# Patient Record
Sex: MALE | Race: WHITE | NOT HISPANIC OR LATINO | Employment: FULL TIME | URBAN - METROPOLITAN AREA
[De-identification: names, ages, dates, MRNs, and addresses within clinical notes are randomized per-mention and may not be internally consistent; named-entity substitution may affect disease eponyms.]

---

## 2022-01-17 ENCOUNTER — HOSPITAL ENCOUNTER (EMERGENCY)
Facility: CLINIC | Age: 52
Discharge: HOME | End: 2022-01-17
Attending: EMERGENCY MEDICINE | Admitting: EMERGENCY MEDICINE
Payer: COMMERCIAL

## 2022-01-17 VITALS
RESPIRATION RATE: 17 BRPM | SYSTOLIC BLOOD PRESSURE: 146 MMHG | OXYGEN SATURATION: 97 % | TEMPERATURE: 97.5 F | HEART RATE: 63 BPM | DIASTOLIC BLOOD PRESSURE: 22 MMHG

## 2022-01-17 DIAGNOSIS — S05.02XA ABRASION OF LEFT CORNEA, INITIAL ENCOUNTER: ICD-10-CM

## 2022-01-17 DIAGNOSIS — S05.02XA ABRASION OF LEFT CONJUNCTIVA, INITIAL ENCOUNTER: ICD-10-CM

## 2022-01-17 DIAGNOSIS — S05.8X2A ABRASION OF SCLERA OF LEFT EYE, INITIAL ENCOUNTER: ICD-10-CM

## 2022-01-17 PROCEDURE — 250N000009 HC RX 250: Performed by: EMERGENCY MEDICINE

## 2022-01-17 PROCEDURE — 99284 EMERGENCY DEPT VISIT MOD MDM: CPT

## 2022-01-17 PROCEDURE — 250N000009 HC RX 250

## 2022-01-17 RX ORDER — POLYVINYL ALCOHOL 14 MG/ML
2 SOLUTION/ DROPS OPHTHALMIC
Qty: 30 ML | Refills: 0 | Status: SHIPPED | OUTPATIENT
Start: 2022-01-17 | End: 2022-12-30

## 2022-01-17 RX ORDER — PROPARACAINE HYDROCHLORIDE 5 MG/ML
SOLUTION/ DROPS OPHTHALMIC
Status: COMPLETED
Start: 2022-01-17 | End: 2022-01-17

## 2022-01-17 RX ORDER — ERYTHROMYCIN 5 MG/G
0.5 OINTMENT OPHTHALMIC 4 TIMES DAILY
Qty: 14 G | Refills: 0 | Status: SHIPPED | OUTPATIENT
Start: 2022-01-17 | End: 2022-01-24

## 2022-01-17 RX ORDER — ERYTHROMYCIN 5 MG/G
OINTMENT OPHTHALMIC ONCE
Status: COMPLETED | OUTPATIENT
Start: 2022-01-17 | End: 2022-01-17

## 2022-01-17 RX ADMIN — ERYTHROMYCIN 1 G: 5 OINTMENT OPHTHALMIC at 02:04

## 2022-01-17 RX ADMIN — PROPARACAINE HYDROCHLORIDE: 5 SOLUTION/ DROPS OPHTHALMIC at 01:10

## 2022-01-17 RX ADMIN — FLUORESCEIN SODIUM 600 MCG: 0.6 STRIP OPHTHALMIC at 01:10

## 2022-01-17 ASSESSMENT — ENCOUNTER SYMPTOMS
EYE PAIN: 1
FEVER: 0
COUGH: 0
RHINORRHEA: 0

## 2022-01-17 ASSESSMENT — VISUAL ACUITY
OS: 20/30
OD: 20/20

## 2022-01-17 NOTE — ED PROVIDER NOTES
History   Chief Complaint:  Eye Injury       HPI   Jez Mojica is a 52 year old male who presents with a left eye injury. Tonight at 2315, the patient was filling a hockey rink in his backyard with water and was using a squeegee on the ice when the wood end of the squeegee hit his left eye. This caused him to fall but he denies hitting his head or loss of consciousness. Denies any bleeding. He does have blurry vision in his left eye and rates his pain a 5/10 here in the ED. Denies recent illness including fever, cough, or runny nose. Some tobacco use. No alcohol or illegal drug use.      Review of Systems   Constitutional: Negative for fever.   HENT: Negative for rhinorrhea.    Eyes: Positive for pain (left) and visual disturbance (blurry vision, left eye).   Respiratory: Negative for cough.    Neurological: Negative for syncope.   All other systems reviewed and are negative.      Allergies:  The patient does not have any allergies    Medications:  Sertraline  Albuterol    Past Medical History:     Diverticulosis  Basal cell carcinoma of face  Adenomatous polyp of colon  Anxiety    Past Surgical History:    Colectomy  Skin cancer excision  Appendectomy    Family History:    Father: cancer  Mother: lymphoma  Brother: anxiety  Sister: diverticulitis    Social History:  Presents alone    Physical Exam     Patient Vitals for the past 24 hrs:   BP Temp Temp src Pulse Resp SpO2   01/17/22 0048 (!) 146/22 97.5  F (36.4  C) Oral 63 17 97 %   01/17/22 0045 (!) 146/22 -- -- 73 -- 97 %       Physical Exam  Constitutional: Well developed, nontox appearance  Head: Atraumatic.   Mouth/Throat: Oropharynx is clear and moist.   Neck:  no stridor  Eyes: no scleral icterus, EOMI, PERRL, mild L inferior periorbital swelling              R eye: normal              L eye: Conjunctival injection and abrasion/injury noted to lateral inferior conjunctiva and sclera, no active hemorrhage, no corneal flare, no hyphema or hypopyon, no  purulent discharge, lateral inferior corneal abrasions, absent Edwin sign  Cardiovascular: RRR  Pulmonary/Chest: nml resp effort  Ext: Warm, well perfused, no edema  Neurological: A&O, symmetric facies, moves ext x4  Skin: Skin is warm and dry.   Psychiatric: Behavior is normal. Thought content normal.   Nursing note and vitals reviewed.    Emergency Department Course     Emergency Department Course:     Reviewed:  I reviewed nursing notes, vitals, past medical history and Care Everywhere    Assessments:  0100 I obtained history and examined the patient as noted above.    0307 I rechecked the patient and explained findings.    Interventions:  0110 Proparacaine 0.5% opthalmic solution    0110 Fluorescein 600 mcg Left Eye    0204 Erythromycin 1 g Left Eye    Disposition:  The patient was discharged to home.     Impression & Plan     Medical Decision Makin year old male presenting w/ eye injury and pain     Presentation consistent with corneal abrasion/injury and conjunctival injury, no foreign body noted on exam. Doubt bacterial conjunctivitis, viral conjunctivitis, foreign body, chemical vs allergic conjunctivitis, corneal ulcer, HSV, herpes zoster opthalmicus, endopthalmitis, orbital cellulitis.  There is no evidence of a ruptured globe on exam.  The patient was treated with antibiotics, first dose given in ED. At this time I feel the patient is safe for discharge.  Recommendations given regarding follow up with ophthalmology and return to the emergency department as needed for new or worsening symptoms.  Patient counseled on all results, diagnosis and disposition.  They are understanding and agreeable to plan. Patient discharged in stable condition.       Diagnosis:    ICD-10-CM    1. Abrasion of left cornea, initial encounter  S05.02XA    2. Abrasion of left conjunctiva, initial encounter  S05.02XA    3. Abrasion of sclera of left eye, initial encounter  S05.8X2A        Discharge Medications:  Discharge  Medication List as of 1/17/2022  3:18 AM      START taking these medications    Details   artificial tears OINT ophthalmic ointment Place 1 g Into the left eye At Bedtime, Disp-7 g, R-0, E-Prescribe      erythromycin (ROMYCIN) 5 MG/GM ophthalmic ointment Place 0.5 inches Into the left eye 4 times daily for 7 daysDisp-14 g, [id number]      polyvinyl alcohol (LIQUIFILM TEARS) 1.4 % ophthalmic solution Place 2 drops Into the left eye every hour as needed for dry eyes, Disp-30 mL, R-0, Local Print             Scribe Disclosure:  I, El Brasher, am serving as a scribe at 1:00 AM on 1/17/2022 to document services personally performed by Yohannes Macdonald MD based on my observations and the provider's statements to me.         Yohannes Macdonald MD  01/17/22 0634

## 2022-01-17 NOTE — DISCHARGE INSTRUCTIONS
Please return to the emergency department as needed for new or worsening symptoms including sudden decrease in vision, severe and uncontrollable pain, fever greater than 100.4  F, vomiting unable to keep anything down, any other concerning symptoms.    Please follow-up with an ophthalmologist tomorrow.  Please call first thing in the morning to make an appointment.      Discharge Instructions  Corneal Abrasion    Today you were treated for a scratch on the cornea of your eye, or a corneal abrasion.  The cornea is the clear layer of tissue that covers the colored part of your eye. Corneal abrasions are caused when something scratches your eye such as fingernails, animal paws, branches, pieces of paper, tiny pieces of rust, wood, glass, plastic or contact lenses. Corneal abrasions often make people feel like there is a speck of sand in the eye.  These abrasions also can cause severe eye pain, watery eyes, blurred vision and pain with bright light.      Generally, every Emergency Department visit should have a follow-up clinic visit with either a primary or a specialty clinic/provider. Please follow-up as instructed by your emergency provider today.    Return to the Emergency Department if:  Your vision worsens.  The appearance of your eye concerns you.  Anything else concerns you.    Treatment:  Tylenol  (acetaminophen), Motrin  (ibuprofen), or Advil  (ibuprofen) will help with the pain from the abrasion.    Use the antibiotic eye ointment or drops as directed until the antibiotics are finished.  Do not wear contacts until the antibiotic is finished.  Do not patch your eye, because this can increase your risk for infection.  Your symptoms should improve gradually over the next 2 days.  If they are not improving, it is very important that you see an eye provider right away.  If over the next few days, the pain is getting worse, you have increasing difficulty with vision or you have yellow drainage from your eye, you  need to see the eye provider that day.  If you have difficulty getting in to see an eye provider, please return to an Urgent Care or Emergency Department for further evaluation and treatment.    If you were given a prescription for medicine here today, be sure to read all of the information (including the package insert) that comes with your prescription.  This will include important information about the medicine, its side effects, and any warnings that you need to know about.  The pharmacist who fills the prescription can provide more information and answer questions you may have about the medicine.  If you have questions or concerns that the pharmacist cannot address, please call or return to the Emergency Department.   Remember that you can always come back to the Emergency Department if you are not able to see your regular provider in the amount of time listed above, if you get any new symptoms, or if there is anything that worries you.

## 2022-01-17 NOTE — ED TRIAGE NOTES
BIBA from home. Here for left eye injury with a pice of wood at around 2315. C/o pain and redness to the sclera.     Obvious laceration to the eye ball noted

## 2022-12-30 ENCOUNTER — OFFICE VISIT (OUTPATIENT)
Dept: URGENT CARE | Facility: URGENT CARE | Age: 52
End: 2022-12-30
Payer: COMMERCIAL

## 2022-12-30 VITALS
RESPIRATION RATE: 18 BRPM | TEMPERATURE: 98.6 F | HEART RATE: 88 BPM | DIASTOLIC BLOOD PRESSURE: 82 MMHG | WEIGHT: 179 LBS | SYSTOLIC BLOOD PRESSURE: 129 MMHG | OXYGEN SATURATION: 98 %

## 2022-12-30 DIAGNOSIS — K04.7 TOOTH ABSCESS: Primary | ICD-10-CM

## 2022-12-30 PROCEDURE — 99203 OFFICE O/P NEW LOW 30 MIN: CPT | Performed by: PHYSICIAN ASSISTANT

## 2022-12-30 RX ORDER — SERTRALINE HYDROCHLORIDE 100 MG/1
2 TABLET, FILM COATED ORAL DAILY
COMMUNITY
Start: 2022-12-13

## 2022-12-30 RX ORDER — AMOXICILLIN 875 MG
875 TABLET ORAL 2 TIMES DAILY
Qty: 20 TABLET | Refills: 0 | Status: SHIPPED | OUTPATIENT
Start: 2022-12-30 | End: 2023-01-09

## 2022-12-31 NOTE — PROGRESS NOTES
SUBJECTIVE:   Jez Mojica is a 52 year old male who complains of TOOTH PAIN left eye tooth top for 5 days. He denies a history of fever  He is planning on seeing his dentist next week     OBJECTIVE:    /82   Pulse 88   Temp 98.6  F (37  C) (Tympanic)   Resp 18   Wt 81.2 kg (179 lb)   SpO2 98%     He appears well, vital signs are as noted by the nurse. Ears normal.  Throat and pharynx normal.  Teeth in ill repair and stained, he is tender over the upper left eye tooth with minor gum erythema  Neck supple. No adenopathy in the neck. . The chest is clear, without wheezes or rales.    ASSESSMENT:    Diagnosis Comments   1. Tooth abscess  amoxicillin (AMOXIL) 875 MG tablet             PLAN:  Symptomatic therapy suggested: . Call or return to clinic prn if these symptoms worsen or fail to improve as anticipated.

## 2023-02-19 ENCOUNTER — APPOINTMENT (OUTPATIENT)
Dept: GENERAL RADIOLOGY | Facility: CLINIC | Age: 53
End: 2023-02-19
Attending: EMERGENCY MEDICINE
Payer: COMMERCIAL

## 2023-02-19 ENCOUNTER — ANESTHESIA EVENT (OUTPATIENT)
Dept: SURGERY | Facility: CLINIC | Age: 53
End: 2023-02-19
Payer: COMMERCIAL

## 2023-02-19 ENCOUNTER — HOSPITAL ENCOUNTER (OUTPATIENT)
Facility: CLINIC | Age: 53
Setting detail: OBSERVATION
Discharge: HOME | End: 2023-02-19
Attending: EMERGENCY MEDICINE | Admitting: INTERNAL MEDICINE
Payer: COMMERCIAL

## 2023-02-19 ENCOUNTER — ANESTHESIA (OUTPATIENT)
Dept: SURGERY | Facility: CLINIC | Age: 53
End: 2023-02-19
Payer: COMMERCIAL

## 2023-02-19 ENCOUNTER — APPOINTMENT (OUTPATIENT)
Dept: ULTRASOUND IMAGING | Facility: CLINIC | Age: 53
End: 2023-02-19
Attending: EMERGENCY MEDICINE
Payer: COMMERCIAL

## 2023-02-19 VITALS
SYSTOLIC BLOOD PRESSURE: 124 MMHG | HEIGHT: 68 IN | OXYGEN SATURATION: 95 % | TEMPERATURE: 97 F | WEIGHT: 171.1 LBS | HEART RATE: 71 BPM | RESPIRATION RATE: 16 BRPM | BODY MASS INDEX: 25.93 KG/M2 | DIASTOLIC BLOOD PRESSURE: 82 MMHG

## 2023-02-19 DIAGNOSIS — K80.00 CALCULUS OF GALLBLADDER WITH ACUTE CHOLECYSTITIS WITHOUT OBSTRUCTION: ICD-10-CM

## 2023-02-19 DIAGNOSIS — R07.9 CHEST PAIN, UNSPECIFIED TYPE: ICD-10-CM

## 2023-02-19 LAB
ALBUMIN SERPL BCG-MCNC: 4.3 G/DL (ref 3.5–5.2)
ALP SERPL-CCNC: 113 U/L (ref 40–129)
ALT SERPL W P-5'-P-CCNC: 30 U/L (ref 10–50)
ANION GAP SERPL CALCULATED.3IONS-SCNC: 11 MMOL/L (ref 7–15)
AST SERPL W P-5'-P-CCNC: 26 U/L (ref 10–50)
ATRIAL RATE - MUSE: 66 BPM
BASOPHILS # BLD AUTO: 0 10E3/UL (ref 0–0.2)
BASOPHILS NFR BLD AUTO: 0 %
BILIRUB SERPL-MCNC: 0.6 MG/DL
BUN SERPL-MCNC: 13.6 MG/DL (ref 6–20)
CALCIUM SERPL-MCNC: 10.1 MG/DL (ref 8.6–10)
CHLORIDE SERPL-SCNC: 98 MMOL/L (ref 98–107)
CREAT SERPL-MCNC: 0.93 MG/DL (ref 0.67–1.17)
DEPRECATED HCO3 PLAS-SCNC: 27 MMOL/L (ref 22–29)
DIASTOLIC BLOOD PRESSURE - MUSE: NORMAL MMHG
EOSINOPHIL # BLD AUTO: 0.1 10E3/UL (ref 0–0.7)
EOSINOPHIL NFR BLD AUTO: 1 %
ERYTHROCYTE [DISTWIDTH] IN BLOOD BY AUTOMATED COUNT: 12.6 % (ref 10–15)
GFR SERPL CREATININE-BSD FRML MDRD: >90 ML/MIN/1.73M2
GLUCOSE SERPL-MCNC: 139 MG/DL (ref 70–99)
HCT VFR BLD AUTO: 43.5 % (ref 40–53)
HGB BLD-MCNC: 14.8 G/DL (ref 13.3–17.7)
HOLD SPECIMEN: NORMAL
IMM GRANULOCYTES # BLD: 0.1 10E3/UL
IMM GRANULOCYTES NFR BLD: 1 %
INTERPRETATION ECG - MUSE: NORMAL
LIPASE SERPL-CCNC: 54 U/L (ref 13–60)
LYMPHOCYTES # BLD AUTO: 1.1 10E3/UL (ref 0.8–5.3)
LYMPHOCYTES NFR BLD AUTO: 12 %
MCH RBC QN AUTO: 28.7 PG (ref 26.5–33)
MCHC RBC AUTO-ENTMCNC: 34 G/DL (ref 31.5–36.5)
MCV RBC AUTO: 84 FL (ref 78–100)
MONOCYTES # BLD AUTO: 0.6 10E3/UL (ref 0–1.3)
MONOCYTES NFR BLD AUTO: 7 %
NEUTROPHILS # BLD AUTO: 6.7 10E3/UL (ref 1.6–8.3)
NEUTROPHILS NFR BLD AUTO: 79 %
NRBC # BLD AUTO: 0 10E3/UL
NRBC BLD AUTO-RTO: 0 /100
P AXIS - MUSE: 4 DEGREES
PLATELET # BLD AUTO: 175 10E3/UL (ref 150–450)
POTASSIUM SERPL-SCNC: 3.8 MMOL/L (ref 3.4–5.3)
PR INTERVAL - MUSE: 128 MS
PROT SERPL-MCNC: 7.5 G/DL (ref 6.4–8.3)
QRS DURATION - MUSE: 90 MS
QT - MUSE: 402 MS
QTC - MUSE: 421 MS
R AXIS - MUSE: 79 DEGREES
RBC # BLD AUTO: 5.16 10E6/UL (ref 4.4–5.9)
SODIUM SERPL-SCNC: 136 MMOL/L (ref 136–145)
SYSTOLIC BLOOD PRESSURE - MUSE: NORMAL MMHG
T AXIS - MUSE: 69 DEGREES
TROPONIN T SERPL HS-MCNC: 8 NG/L
TROPONIN T SERPL HS-MCNC: 9 NG/L
VENTRICULAR RATE- MUSE: 66 BPM
WBC # BLD AUTO: 8.5 10E3/UL (ref 4–11)

## 2023-02-19 PROCEDURE — 93005 ELECTROCARDIOGRAM TRACING: CPT

## 2023-02-19 PROCEDURE — 250N000009 HC RX 250: Performed by: EMERGENCY MEDICINE

## 2023-02-19 PROCEDURE — 80053 COMPREHEN METABOLIC PANEL: CPT | Performed by: EMERGENCY MEDICINE

## 2023-02-19 PROCEDURE — 47562 LAPAROSCOPIC CHOLECYSTECTOMY: CPT | Performed by: SURGERY

## 2023-02-19 PROCEDURE — 360N000076 HC SURGERY LEVEL 3, PER MIN: Performed by: SURGERY

## 2023-02-19 PROCEDURE — 250N000013 HC RX MED GY IP 250 OP 250 PS 637: Performed by: EMERGENCY MEDICINE

## 2023-02-19 PROCEDURE — 250N000011 HC RX IP 250 OP 636: Performed by: HOSPITALIST

## 2023-02-19 PROCEDURE — 47562 LAPAROSCOPIC CHOLECYSTECTOMY: CPT | Mod: AS | Performed by: PHYSICIAN ASSISTANT

## 2023-02-19 PROCEDURE — C9113 INJ PANTOPRAZOLE SODIUM, VIA: HCPCS | Performed by: HOSPITALIST

## 2023-02-19 PROCEDURE — 88304 TISSUE EXAM BY PATHOLOGIST: CPT | Mod: TC | Performed by: SURGERY

## 2023-02-19 PROCEDURE — 250N000013 HC RX MED GY IP 250 OP 250 PS 637: Performed by: SURGERY

## 2023-02-19 PROCEDURE — 76705 ECHO EXAM OF ABDOMEN: CPT

## 2023-02-19 PROCEDURE — 710N000012 HC RECOVERY PHASE 2, PER MINUTE: Performed by: SURGERY

## 2023-02-19 PROCEDURE — 99221 1ST HOSP IP/OBS SF/LOW 40: CPT | Mod: 57 | Performed by: PHYSICIAN ASSISTANT

## 2023-02-19 PROCEDURE — 999N000141 HC STATISTIC PRE-PROCEDURE NURSING ASSESSMENT: Performed by: SURGERY

## 2023-02-19 PROCEDURE — 250N000009 HC RX 250: Performed by: NURSE ANESTHETIST, CERTIFIED REGISTERED

## 2023-02-19 PROCEDURE — 258N000003 HC RX IP 258 OP 636: Performed by: NURSE ANESTHETIST, CERTIFIED REGISTERED

## 2023-02-19 PROCEDURE — 250N000009 HC RX 250: Performed by: SURGERY

## 2023-02-19 PROCEDURE — 88304 TISSUE EXAM BY PATHOLOGIST: CPT | Mod: 26 | Performed by: PATHOLOGY

## 2023-02-19 PROCEDURE — 272N000001 HC OR GENERAL SUPPLY STERILE: Performed by: SURGERY

## 2023-02-19 PROCEDURE — 710N000009 HC RECOVERY PHASE 1, LEVEL 1, PER MIN: Performed by: SURGERY

## 2023-02-19 PROCEDURE — 258N000003 HC RX IP 258 OP 636: Performed by: HOSPITALIST

## 2023-02-19 PROCEDURE — 99207 PR NO BILLABLE SERVICE THIS VISIT: CPT | Performed by: HOSPITALIST

## 2023-02-19 PROCEDURE — G0378 HOSPITAL OBSERVATION PER HR: HCPCS

## 2023-02-19 PROCEDURE — 84484 ASSAY OF TROPONIN QUANT: CPT | Performed by: EMERGENCY MEDICINE

## 2023-02-19 PROCEDURE — 99285 EMERGENCY DEPT VISIT HI MDM: CPT | Mod: 25

## 2023-02-19 PROCEDURE — 85025 COMPLETE CBC W/AUTO DIFF WBC: CPT | Performed by: EMERGENCY MEDICINE

## 2023-02-19 PROCEDURE — 250N000011 HC RX IP 250 OP 636: Performed by: EMERGENCY MEDICINE

## 2023-02-19 PROCEDURE — 370N000017 HC ANESTHESIA TECHNICAL FEE, PER MIN: Performed by: SURGERY

## 2023-02-19 PROCEDURE — 96374 THER/PROPH/DIAG INJ IV PUSH: CPT

## 2023-02-19 PROCEDURE — 250N000025 HC SEVOFLURANE, PER MIN: Performed by: SURGERY

## 2023-02-19 PROCEDURE — 99221 1ST HOSP IP/OBS SF/LOW 40: CPT | Mod: AI | Performed by: INTERNAL MEDICINE

## 2023-02-19 PROCEDURE — 71046 X-RAY EXAM CHEST 2 VIEWS: CPT

## 2023-02-19 PROCEDURE — 83690 ASSAY OF LIPASE: CPT | Performed by: EMERGENCY MEDICINE

## 2023-02-19 PROCEDURE — 250N000011 HC RX IP 250 OP 636: Performed by: NURSE ANESTHETIST, CERTIFIED REGISTERED

## 2023-02-19 PROCEDURE — 36415 COLL VENOUS BLD VENIPUNCTURE: CPT | Performed by: EMERGENCY MEDICINE

## 2023-02-19 RX ORDER — HYDROCODONE BITARTRATE AND ACETAMINOPHEN 5; 325 MG/1; MG/1
1-2 TABLET ORAL EVERY 4 HOURS PRN
Qty: 11 TABLET | Refills: 0 | Status: SHIPPED | OUTPATIENT
Start: 2023-02-19

## 2023-02-19 RX ORDER — ONDANSETRON 2 MG/ML
4 INJECTION INTRAMUSCULAR; INTRAVENOUS EVERY 6 HOURS PRN
Status: DISCONTINUED | OUTPATIENT
Start: 2023-02-19 | End: 2023-02-19 | Stop reason: HOSPADM

## 2023-02-19 RX ORDER — HYDROMORPHONE HYDROCHLORIDE 1 MG/ML
INJECTION, SOLUTION INTRAMUSCULAR; INTRAVENOUS; SUBCUTANEOUS PRN
Status: DISCONTINUED | OUTPATIENT
Start: 2023-02-19 | End: 2023-02-19

## 2023-02-19 RX ORDER — NALOXONE HYDROCHLORIDE 0.4 MG/ML
0.2 INJECTION, SOLUTION INTRAMUSCULAR; INTRAVENOUS; SUBCUTANEOUS
Status: DISCONTINUED | OUTPATIENT
Start: 2023-02-19 | End: 2023-02-19 | Stop reason: HOSPADM

## 2023-02-19 RX ORDER — FENTANYL CITRATE 50 UG/ML
25 INJECTION, SOLUTION INTRAMUSCULAR; INTRAVENOUS EVERY 5 MIN PRN
Status: DISCONTINUED | OUTPATIENT
Start: 2023-02-19 | End: 2023-02-19 | Stop reason: HOSPADM

## 2023-02-19 RX ORDER — PIPERACILLIN SODIUM, TAZOBACTAM SODIUM 4; .5 G/20ML; G/20ML
4.5 INJECTION, POWDER, LYOPHILIZED, FOR SOLUTION INTRAVENOUS ONCE
Status: COMPLETED | OUTPATIENT
Start: 2023-02-19 | End: 2023-02-19

## 2023-02-19 RX ORDER — PIPERACILLIN SODIUM, TAZOBACTAM SODIUM 3; .375 G/15ML; G/15ML
3.38 INJECTION, POWDER, LYOPHILIZED, FOR SOLUTION INTRAVENOUS EVERY 6 HOURS
Status: DISCONTINUED | OUTPATIENT
Start: 2023-02-19 | End: 2023-02-19 | Stop reason: HOSPADM

## 2023-02-19 RX ORDER — HYDROMORPHONE HCL IN WATER/PF 6 MG/30 ML
0.4 PATIENT CONTROLLED ANALGESIA SYRINGE INTRAVENOUS
Status: DISCONTINUED | OUTPATIENT
Start: 2023-02-19 | End: 2023-02-19 | Stop reason: HOSPADM

## 2023-02-19 RX ORDER — ONDANSETRON 4 MG/1
4 TABLET, ORALLY DISINTEGRATING ORAL EVERY 30 MIN PRN
Status: DISCONTINUED | OUTPATIENT
Start: 2023-02-19 | End: 2023-02-19 | Stop reason: HOSPADM

## 2023-02-19 RX ORDER — AMOXICILLIN 250 MG
1-2 CAPSULE ORAL 2 TIMES DAILY PRN
Qty: 15 TABLET | Refills: 0 | Status: SHIPPED | OUTPATIENT
Start: 2023-02-19 | End: 2023-02-19

## 2023-02-19 RX ORDER — SODIUM CHLORIDE 9 MG/ML
INJECTION, SOLUTION INTRAVENOUS CONTINUOUS
Status: DISCONTINUED | OUTPATIENT
Start: 2023-02-19 | End: 2023-02-19 | Stop reason: HOSPADM

## 2023-02-19 RX ORDER — ACETAMINOPHEN 325 MG/1
650 TABLET ORAL EVERY 6 HOURS PRN
Status: DISCONTINUED | OUTPATIENT
Start: 2023-02-19 | End: 2023-02-19 | Stop reason: HOSPADM

## 2023-02-19 RX ORDER — SODIUM CHLORIDE, SODIUM LACTATE, POTASSIUM CHLORIDE, CALCIUM CHLORIDE 600; 310; 30; 20 MG/100ML; MG/100ML; MG/100ML; MG/100ML
INJECTION, SOLUTION INTRAVENOUS CONTINUOUS
Status: DISCONTINUED | OUTPATIENT
Start: 2023-02-19 | End: 2023-02-19 | Stop reason: HOSPADM

## 2023-02-19 RX ORDER — OXYCODONE HYDROCHLORIDE 5 MG/1
5 TABLET ORAL EVERY 4 HOURS PRN
Status: DISCONTINUED | OUTPATIENT
Start: 2023-02-19 | End: 2023-02-19 | Stop reason: HOSPADM

## 2023-02-19 RX ORDER — HYDROCODONE BITARTRATE AND ACETAMINOPHEN 5; 325 MG/1; MG/1
1-2 TABLET ORAL EVERY 4 HOURS PRN
Qty: 12 TABLET | Refills: 0 | Status: SHIPPED | OUTPATIENT
Start: 2023-02-19 | End: 2023-02-19

## 2023-02-19 RX ORDER — ONDANSETRON 2 MG/ML
INJECTION INTRAMUSCULAR; INTRAVENOUS PRN
Status: DISCONTINUED | OUTPATIENT
Start: 2023-02-19 | End: 2023-02-19

## 2023-02-19 RX ORDER — HYDROMORPHONE HCL IN WATER/PF 6 MG/30 ML
0.2 PATIENT CONTROLLED ANALGESIA SYRINGE INTRAVENOUS
Status: DISCONTINUED | OUTPATIENT
Start: 2023-02-19 | End: 2023-02-19 | Stop reason: HOSPADM

## 2023-02-19 RX ORDER — ONDANSETRON 2 MG/ML
4 INJECTION INTRAMUSCULAR; INTRAVENOUS EVERY 30 MIN PRN
Status: DISCONTINUED | OUTPATIENT
Start: 2023-02-19 | End: 2023-02-19 | Stop reason: HOSPADM

## 2023-02-19 RX ORDER — DEXAMETHASONE SODIUM PHOSPHATE 4 MG/ML
INJECTION, SOLUTION INTRA-ARTICULAR; INTRALESIONAL; INTRAMUSCULAR; INTRAVENOUS; SOFT TISSUE PRN
Status: DISCONTINUED | OUTPATIENT
Start: 2023-02-19 | End: 2023-02-19

## 2023-02-19 RX ORDER — SERTRALINE HYDROCHLORIDE 100 MG/1
200 TABLET, FILM COATED ORAL DAILY
Status: DISCONTINUED | OUTPATIENT
Start: 2023-02-20 | End: 2023-02-19 | Stop reason: HOSPADM

## 2023-02-19 RX ORDER — NALOXONE HYDROCHLORIDE 0.4 MG/ML
0.4 INJECTION, SOLUTION INTRAMUSCULAR; INTRAVENOUS; SUBCUTANEOUS
Status: DISCONTINUED | OUTPATIENT
Start: 2023-02-19 | End: 2023-02-19 | Stop reason: HOSPADM

## 2023-02-19 RX ORDER — LIDOCAINE HYDROCHLORIDE 20 MG/ML
INJECTION, SOLUTION INFILTRATION; PERINEURAL PRN
Status: DISCONTINUED | OUTPATIENT
Start: 2023-02-19 | End: 2023-02-19

## 2023-02-19 RX ORDER — ACETAMINOPHEN 650 MG/1
650 SUPPOSITORY RECTAL EVERY 6 HOURS PRN
Status: DISCONTINUED | OUTPATIENT
Start: 2023-02-19 | End: 2023-02-19 | Stop reason: HOSPADM

## 2023-02-19 RX ORDER — DEXMEDETOMIDINE HYDROCHLORIDE 4 UG/ML
INJECTION, SOLUTION INTRAVENOUS PRN
Status: DISCONTINUED | OUTPATIENT
Start: 2023-02-19 | End: 2023-02-19

## 2023-02-19 RX ORDER — FENTANYL CITRATE 50 UG/ML
50 INJECTION, SOLUTION INTRAMUSCULAR; INTRAVENOUS EVERY 5 MIN PRN
Status: DISCONTINUED | OUTPATIENT
Start: 2023-02-19 | End: 2023-02-19 | Stop reason: HOSPADM

## 2023-02-19 RX ORDER — OXYCODONE HYDROCHLORIDE 5 MG/1
10 TABLET ORAL EVERY 4 HOURS PRN
Status: DISCONTINUED | OUTPATIENT
Start: 2023-02-19 | End: 2023-02-19 | Stop reason: HOSPADM

## 2023-02-19 RX ORDER — HYDROMORPHONE HCL IN WATER/PF 6 MG/30 ML
0.4 PATIENT CONTROLLED ANALGESIA SYRINGE INTRAVENOUS EVERY 5 MIN PRN
Status: DISCONTINUED | OUTPATIENT
Start: 2023-02-19 | End: 2023-02-19 | Stop reason: HOSPADM

## 2023-02-19 RX ORDER — AMOXICILLIN 250 MG
1-2 CAPSULE ORAL 2 TIMES DAILY PRN
Qty: 16 TABLET | Refills: 0 | Status: SHIPPED | OUTPATIENT
Start: 2023-02-19

## 2023-02-19 RX ORDER — BUPIVACAINE HYDROCHLORIDE AND EPINEPHRINE 5; 5 MG/ML; UG/ML
INJECTION, SOLUTION PERINEURAL PRN
Status: DISCONTINUED | OUTPATIENT
Start: 2023-02-19 | End: 2023-02-19 | Stop reason: HOSPADM

## 2023-02-19 RX ORDER — HYDROMORPHONE HCL IN WATER/PF 6 MG/30 ML
0.2 PATIENT CONTROLLED ANALGESIA SYRINGE INTRAVENOUS EVERY 5 MIN PRN
Status: DISCONTINUED | OUTPATIENT
Start: 2023-02-19 | End: 2023-02-19 | Stop reason: HOSPADM

## 2023-02-19 RX ORDER — PROPOFOL 10 MG/ML
INJECTION, EMULSION INTRAVENOUS PRN
Status: DISCONTINUED | OUTPATIENT
Start: 2023-02-19 | End: 2023-02-19

## 2023-02-19 RX ORDER — ONDANSETRON 4 MG/1
4 TABLET, ORALLY DISINTEGRATING ORAL EVERY 6 HOURS PRN
Status: DISCONTINUED | OUTPATIENT
Start: 2023-02-19 | End: 2023-02-19 | Stop reason: HOSPADM

## 2023-02-19 RX ORDER — FENTANYL CITRATE 50 UG/ML
INJECTION, SOLUTION INTRAMUSCULAR; INTRAVENOUS PRN
Status: DISCONTINUED | OUTPATIENT
Start: 2023-02-19 | End: 2023-02-19

## 2023-02-19 RX ORDER — HYDROCODONE BITARTRATE AND ACETAMINOPHEN 5; 325 MG/1; MG/1
1-2 TABLET ORAL
Status: DISCONTINUED | OUTPATIENT
Start: 2023-02-19 | End: 2023-02-19 | Stop reason: HOSPADM

## 2023-02-19 RX ADMIN — PIPERACILLIN AND TAZOBACTAM 3.38 G: 3; .375 INJECTION, POWDER, FOR SOLUTION INTRAVENOUS at 11:27

## 2023-02-19 RX ADMIN — LIDOCAINE HYDROCHLORIDE 30 ML: 20 SOLUTION ORAL; TOPICAL at 04:59

## 2023-02-19 RX ADMIN — FENTANYL CITRATE 100 MCG: 50 INJECTION, SOLUTION INTRAMUSCULAR; INTRAVENOUS at 11:18

## 2023-02-19 RX ADMIN — PANTOPRAZOLE SODIUM 40 MG: 40 INJECTION, POWDER, FOR SOLUTION INTRAVENOUS at 09:22

## 2023-02-19 RX ADMIN — MIDAZOLAM 2 MG: 1 INJECTION INTRAMUSCULAR; INTRAVENOUS at 11:13

## 2023-02-19 RX ADMIN — LIDOCAINE HYDROCHLORIDE 100 MG: 20 INJECTION, SOLUTION INFILTRATION; PERINEURAL at 11:18

## 2023-02-19 RX ADMIN — ONDANSETRON 4 MG: 2 INJECTION INTRAMUSCULAR; INTRAVENOUS at 12:03

## 2023-02-19 RX ADMIN — HYDROMORPHONE HYDROCHLORIDE 0.5 MG: 1 INJECTION, SOLUTION INTRAMUSCULAR; INTRAVENOUS; SUBCUTANEOUS at 11:46

## 2023-02-19 RX ADMIN — DEXAMETHASONE SODIUM PHOSPHATE 4 MG: 4 INJECTION, SOLUTION INTRA-ARTICULAR; INTRALESIONAL; INTRAMUSCULAR; INTRAVENOUS; SOFT TISSUE at 11:27

## 2023-02-19 RX ADMIN — DEXMEDETOMIDINE HYDROCHLORIDE 8 MCG: 200 INJECTION INTRAVENOUS at 11:51

## 2023-02-19 RX ADMIN — OXYCODONE HYDROCHLORIDE 5 MG: 5 TABLET ORAL at 13:21

## 2023-02-19 RX ADMIN — PHENYLEPHRINE HYDROCHLORIDE 100 MCG: 10 INJECTION INTRAVENOUS at 11:28

## 2023-02-19 RX ADMIN — DEXMEDETOMIDINE HYDROCHLORIDE 12 MCG: 200 INJECTION INTRAVENOUS at 11:48

## 2023-02-19 RX ADMIN — PHENYLEPHRINE HYDROCHLORIDE 100 MCG: 10 INJECTION INTRAVENOUS at 11:24

## 2023-02-19 RX ADMIN — PROPOFOL 200 MG: 10 INJECTION, EMULSION INTRAVENOUS at 11:18

## 2023-02-19 RX ADMIN — FENTANYL CITRATE 50 MCG: 50 INJECTION, SOLUTION INTRAMUSCULAR; INTRAVENOUS at 12:00

## 2023-02-19 RX ADMIN — PHENYLEPHRINE HYDROCHLORIDE 100 MCG: 10 INJECTION INTRAVENOUS at 11:32

## 2023-02-19 RX ADMIN — SUGAMMADEX 200 MG: 100 INJECTION, SOLUTION INTRAVENOUS at 12:18

## 2023-02-19 RX ADMIN — SODIUM CHLORIDE: 9 INJECTION, SOLUTION INTRAVENOUS at 09:24

## 2023-02-19 RX ADMIN — PHENYLEPHRINE HYDROCHLORIDE 100 MCG: 10 INJECTION INTRAVENOUS at 11:40

## 2023-02-19 RX ADMIN — PIPERACILLIN AND TAZOBACTAM 4.5 G: 4; .5 INJECTION, POWDER, FOR SOLUTION INTRAVENOUS at 06:36

## 2023-02-19 RX ADMIN — ROCURONIUM BROMIDE 50 MG: 50 INJECTION, SOLUTION INTRAVENOUS at 11:19

## 2023-02-19 RX ADMIN — SODIUM CHLORIDE: 9 INJECTION, SOLUTION INTRAVENOUS at 12:33

## 2023-02-19 ASSESSMENT — ENCOUNTER SYMPTOMS
ARTHRALGIAS: 0
DYSRHYTHMIAS: 0
BACK PAIN: 0

## 2023-02-19 ASSESSMENT — ACTIVITIES OF DAILY LIVING (ADL)
ADLS_ACUITY_SCORE: 35

## 2023-02-19 NOTE — OP NOTE
General Surgery Operative Note    PREOPERATIVE DIAGNOSIS:  Calculus of gallbladder with acute cholecystitis without obstruction [K80.00]    POSTOPERATIVE DIAGNOSIS:  same    PROCEDURE:   Procedure(s):  CHOLECYSTECTOMY, LAPAROSCOPIC    ANESTHESIA:  General.    PREOPERATIVE MEDICATIONS:  Ancef IV.    SURGEON:  Jez Means MD    ASSISTANT:  Arely Mcgowan PA-C  A first assistant was necessary owing to challenging laparoscopic visualization and exposure.  Retraction was also necessary.    INDICATIONS:  Symptomatic cholelithiasis    PROCEDURE:  The patient was taken to the operating suite and uneventfully endotracheally intubated.  The abdomen was prepped and draped in a sterile fashion.  Surgeon initiated timeout was acknowledged.  We entered the abdomen in the left upper quadrant using Visiport technique.  Three other trocars were placed under laparoscopic visualization.  We elevated the liver and were able to identify a somewhat inflamed gallbladder.  The gallbladder was grasped and used to elevate the liver further.  We began dissecting out some fatty adhesions down near the neck of the gallbladder until a cystic duct was encountered.  We continued our dissection using combination of sharp and blunt dissection until the cystic duct was largely dissected out.  We continued our dissection up along the sides of the gallbladder, both medially and laterally, until we had created a space between the gallbladder and the liver.  At this point, we encountered the cystic artery, just posterior and lateral to the cystic duct.  This again was dissected out.  Once we had created a window where only the cystic artery and duct were noted to be entering the gallbladder, we felt that this represented our critical view.  The cystic artery and duct were then doubly clipped and divided.  We continued our dissection up along the body of the gallbladder, freeing all attachments and adhesions of the gallbladder to the liver.   Gallbladder was removed from the liver in an atraumatic fashion.  The gallbladder was then brought up through the umbilical port site and removed from the abdomen.  The gallbladder fossa was reinspected, and all areas of bleeding were managed with electrocautery.  We irrigated the area with normal saline and aspirated it out.  We then removed the umbilical port trocar and closed the fascia with a figure-of-eight 0 Vicryl suture.  This was done using the Thien-Vidhya device.  We then reinspected the abdomen, and everything appeared to be in pristine condition.  We removed the trocars under laparoscopic visualization and desufflated the abdomen with the Streeter suction .  The skin edges were reapproximated with 4-0 Vicryl and Steri-Strips.  The patient was uneventfully extubated, awakened and taken to the PACU in stable condition.  At the conclusion of the case, all lap and needle counts were correct.      ESTIMATED BLOOD LOSS:  5 mL    INTRAOPERATIVE FINDINGS:  Gallbladder with stones    Jez Means MD, MD

## 2023-02-19 NOTE — ANESTHESIA PREPROCEDURE EVALUATION
Anesthesia Pre-Procedure Evaluation    Patient: Jez Mojica   MRN: [id number] : 1970        Procedure : Procedure(s):  CHOLECYSTECTOMY, LAPAROSCOPIC          Past Medical History:   Diagnosis Date     Diverticulosis       Past Surgical History:   Procedure Laterality Date     LAPAROSCOPIC ASSISTED COLECTOMY       ZZC APPENDECTOMY  1996      No Known Allergies   Social History     Tobacco Use     Smoking status: Never     Smokeless tobacco: Current     Types: Chew   Substance Use Topics     Alcohol use: No      Wt Readings from Last 1 Encounters:   23 77.6 kg (171 lb 1.6 oz)        Anesthesia Evaluation            ROS/MED HX  ENT/Pulmonary:  - neg pulmonary ROS     Neurologic:       Cardiovascular:  - neg cardiovascular ROS  (-) arrhythmias   METS/Exercise Tolerance: >4 METS    Hematologic:       Musculoskeletal:       GI/Hepatic: Comment: Diverticulosis s/p colectomy    (+) cholecystitis/cholelithiasis,     Renal/Genitourinary:       Endo:       Psychiatric/Substance Use:  - neg psychiatric ROS     Infectious Disease:       Malignancy:       Other:            Physical Exam    Airway        Mallampati: II   TM distance: > 3 FB   Neck ROM: full   Mouth opening: > 3 cm    Respiratory Devices and Support         Dental       (+) Completely normal teeth      Cardiovascular   cardiovascular exam normal          Pulmonary   pulmonary exam normal                OUTSIDE LABS:  CBC:   Lab Results   Component Value Date    WBC 8.5 2023    WBC 16.8 (H) 2010    HGB 14.8 2023    HGB 15.2 2010    HCT 43.5 2023    HCT 44.5 2010     2023     2010     BMP:   Lab Results   Component Value Date     2023     2010    POTASSIUM 3.8 2023    POTASSIUM 4.0 2010    CHLORIDE 98 2023    CHLORIDE 101 2010    CO2 27 2023    CO2 28 2010    BUN 13.6 2023    BUN 12 2010    CR 0.93 2023     CR 0.98 08/20/2010     (H) 02/19/2023     (H) 08/20/2010     COAGS: No results found for: PTT, INR, FIBR  POC: No results found for: BGM, HCG, HCGS  HEPATIC:   Lab Results   Component Value Date    ALBUMIN 4.3 02/19/2023    PROTTOTAL 7.5 02/19/2023    ALT 30 02/19/2023    AST 26 02/19/2023    ALKPHOS 113 02/19/2023    BILITOTAL 0.6 02/19/2023     OTHER:   Lab Results   Component Value Date    NUVIA 10.1 (H) 02/19/2023    LIPASE 54 02/19/2023       Anesthesia Plan    ASA Status:  2   NPO Status:  NPO Appropriate    Anesthesia Type: General.     - Airway: ETT   Induction: Intravenous, RSI, Propofol.   Maintenance: Balanced.   Techniques and Equipment:     - Airway: Video-Laryngoscope         Consents    Anesthesia Plan(s) and associated risks, benefits, and realistic alternatives discussed. Questions answered and patient/representative(s) expressed understanding.    - Discussed:     - Discussed with:  Patient         Postoperative Care    Pain management: IV analgesics, Multi-modal analgesia.   PONV prophylaxis: Ondansetron (or other 5HT-3), Dexamethasone or Solumedrol, Background Propofol Infusion     Comments:                Tree Newton MD

## 2023-02-19 NOTE — PHARMACY-ADMISSION MEDICATION HISTORY
Pharmacy Medication History  Admission medication history interview status for the 2/19/2023  admission is complete. See EPIC admission navigator for prior to admission medications     Location of Interview: Patient room  Medication history sources: Patient and Surescripts    Significant changes made to the medication list:      In the past week, patient estimated taking medication this percent of the time: greater than 90%    Medication Affordability:       Additional medication history information:       Medication reconciliation completed by provider prior to medication history? No    Time spent in this activity: 5min     Prior to Admission medications    Medication Sig Last Dose Taking? Auth Provider Long Term End Date   sertraline (ZOLOFT) 100 MG tablet Take 2 tablets by mouth daily 2/17/2023 Yes Reported, Patient Yes        The information provided in this note is only as accurate as the sources available at the time of update(s)   Karma ReyesD

## 2023-02-19 NOTE — BRIEF OP NOTE
[facility]    Brief Operative Note    Pre-operative diagnosis: Calculus of gallbladder with acute cholecystitis without obstruction [K80.00]  Post-operative diagnosis Same    Procedure:    CHOLECYSTECTOMY, LAPAROSCOPIC    Surgeon:      * Jez Means MD - Primary     * Arely Mcgowan PA-C - Assisting    Anesthesia: General     Estimated Blood Loss: 10 mL from 2/19/2023 11:14 AM to 2/19/2023 12:33 PM    Specimens:   ID Type Source Tests Collected by Time Destination   1 :  Tissue Gallbladder SURGICAL PATHOLOGY EXAM Jez Means MD 2/19/2023 11:52 AM      Findings:    As above. No immediate complications. See operative report for full details.      Arely Mcgowan PA-C  [facility]  [phone]

## 2023-02-19 NOTE — OR NURSING
Pt up to BR with SBA and had strong gait. Pt voided. Awaiting wife to come and  after kids finish with hockey. Pt eating and drinking fine. No pain. Sitting in a recliner.ABCDs intact.

## 2023-02-19 NOTE — CONSULTS
Surgery Consultation, [facility]         Jez Means MD, MD    Jez Mojica MRN# [id number]   YOB: 1970 Age: 53 year old     PCP:  Lili Tabor [phone]    Chief Complaint:  Right upper quadrant pain, nausea    History of Present Illness:  Jez Mojica is a 53 year old male who presented with several episodes of upper abdominal pain and intermittent nausea.  This began last weekend where he noted fairly severe subxiphoid pain.  Called 911 and was told that it was most likely heartburn, so he drank milk and had some symptomatic relief.  2 additional episodes since that time with varying severity.  Continues to take milk and has taken his Tums, but pain persists.  Has migrated to the right upper quadrant.  Intermittent nausea.  No change in bowel or bladder habits.  Admits to severe stress in his life right now, but has not been taking NSAIDs or using tobacco.  Upon presentation to the emergency department, labs are relatively unremarkable but right upper quadrant ultrasound suggested cholecystitis.    PMH:  Jez Mojica  has a past medical history of Diverticulosis.  PSH:  Jez Mojica  has a past surgical history that includes APPENDECTOMY (1996) and Laparoscopic assisted colectomy (2010).    Home medications and allergies reviewed.    Social History:  Jez Mojica  reports that he has never smoked. He has never used smokeless tobacco. He reports that he does not drink alcohol and does not use drugs.  Family History:  Jez Mojica family history is not on file.    ROS:  The 10 point Review of Systems is negative other than noted in the HPI.  No fever or chills..    Physical Exam:  Blood pressure 123/83, pulse 77, temperature 97.5  F (36.4  C), temperature source Oral, resp. rate 14, SpO2 96 %.  0 lbs 0 oz  Healthy gentleman in no distress.  Patient has a pleasant affect, speaks without difficulty.   Pupils equal round and reactive to light.   No  cervical lymphadenopathy or thyromegaly.   Lung fields clear, breathing comfortably.   Heart normal sinus rhythm.  No murmurs rubs or gallops.  Abdomen soft, nontender, nondistended.  Minimal tenderness in the right upper quadrant, no masses appreciated. No peritoneal signs or rebound.  Skin warm, dry, without rashes or lesions.    All new lab and imaging data was reviewed.  Abdominal ultrasound shows gallbladder with thickened wall, probable stones     Assessment/plan:  Jez Mojica is a 53 year old male with signs and symptoms suggesting acute cholecystitis.  Patient also likely has some element of overlying reflux.  Given the persistence of his symptoms this week, I recommended urgent cholecystectomy.  I think he will continue to bounce back to the emergency department if we do not manage this definitively in an expedited way.  Risks and benefits were explained.  If the surgery goes well, may be a candidate for discharge today.    Fantasma Means M.D.  [facility]  [phone]    Please route or send letter to:  Primary Care Provider (PCP) and Referring Provider

## 2023-02-19 NOTE — ED NOTES
[facility]  ED Nurse Handoff Report    ED Chief complaint: Chest Pain      ED Diagnosis:   Final diagnoses:   Calculus of gallbladder with acute cholecystitis without obstruction       Code Status: hospitalist to address    Allergies: No Known Allergies    Patient Story: Pt BIBA for mid center chest pain that started last night at 1800 and got worse throughout the night. Pt states he had the same pain last Monday.   Focused Assessment:  Pt is normal sinus. Alert and oriented x4. Respirations are even, easy, and unlabored. Denies N/V/D.     Treatments and/or interventions provided: IV inserted, blood drawn, xray, ultrasound, GI cocktail, and antibiotics  Patient's response to treatments and/or interventions: Pt states pain improved to 1/10 after GI cocktail    To be done/followed up on inpatient unit:  inpatient orders    Does this patient have any cognitive concerns?: NA    Activity level - Baseline/Home:  Independent  Activity Level - Current:   Independent    Patient's Preferred language: English   Needed?: No    Isolation: None  Infection: Not Applicable  Patient tested for COVID 19 prior to admission: NO  Bariatric?: No    Vital Signs:   Vitals:    02/19/23 0430 02/19/23 0538   BP: (!) 153/99    Pulse: 71 75   Resp: 18 18   Temp: 97.5  F (36.4  C)    TempSrc: Oral    SpO2: 98% 97%       Cardiac Rhythm:     Was the PSS-3 completed:   Yes  What interventions are required if any?               Family Comments:   OBS brochure/video discussed/provided to patient/family: No              Name of person given brochure if not patient:               Relationship to patient:     For the majority of the shift this patient's behavior was Green.   Behavioral interventions performed were NA.    ED NURSE PHONE NUMBER: *[phone]

## 2023-02-19 NOTE — H&P
[facility]    History and Physical - Hospitalist Service       Date of Admission:  2/19/2023    Assessment & Plan      Jez Mojica is a 53 year old male admitted on 2/19/2023. He presents with abdominal pain    Acute cholecystitis  Presented with chest/ epigastric pain ~1800, worsening since. Worse with deep breaths and eating, no radiation. Had previous but pain would resolve. No n/v. In ED AFVSS. LFTs normal, WBC normal. US with cholelithiasis with stones in neck, findings concerning for cholelithiasis. Also noted hepatic steatosis. CXR clear.   - NPO, IV fluids  - zosyn  - general surgery consult  - prn antiemetics, analgesics    Anxiety  - resume sertraline with rec    Hx diverticulosis  Had R colectomy, 12 inches removed 2005.        Diet: NPO per Anesthesia Guidelines for Procedure/Surgery Except for: No Exceptions    DVT Prophylaxis: Pneumatic Compression Devices  Glasgow Catheter: Not present  Lines: None     Cardiac Monitoring: None  Code Status:   Full    Clinically Significant Risk Factors Present on Admission                               Disposition Plan           Tony Hanley MD  Hospitalist Service  [facility]  Securely message with AFTER-MOUSE (more info)  Text page via [facility] Paging/Directory     ______________________________________________________________________    Chief Complaint   Abdominal pain    History is obtained from the patient, electronic health record and emergency department physician    History of Present Illness   Jez Mojica is a 53 year old male who presents with epigastric pain.  Patient states he first noticed  this about 6 days ago he actually called EMS.  The pain improved so he declined to come in.  The pain then recurred 2 days after that but again improved.  This evening his epigastric pain returned.  He points to his epigastric area.  Does not radiate other than may be down to his left upper quadrant.  Does not go  to the back.  Denies fevers or chills.  No nausea or vomiting.  He has a history of a colectomy.  Otherwise denies chest pain or shortness of breath.      Past Medical History    Past Medical History:   Diagnosis Date     Diverticulosis        Past Surgical History   Past Surgical History:   Procedure Laterality Date     LAPAROSCOPIC ASSISTED COLECTOMY  2010     ZZ APPENDECTOMY  1996       Prior to Admission Medications   Prior to Admission Medications   Prescriptions Last Dose Informant Patient Reported? Taking?   sertraline (ZOLOFT) 100 MG tablet   Yes No   Sig: Take 2 tablets by mouth daily      Facility-Administered Medications: None        Review of Systems    The 10 point Review of Systems is negative other than noted in the HPI or here.      Physical Exam   Vital Signs: Temp: 97.5  F (36.4  C) Temp src: Oral BP: (!) 153/99 Pulse: 75   Resp: 18 SpO2: 97 %      Weight: 0 lbs 0 oz    General Appearance: Alert, pleasant, no distress  Respiratory: CTA B  Cardiovascular: RRR. No murmur. No edema  GI: tender in epigastric area  Skin: no rashes or lesions grossly  Other: SMITH     Medical Decision Making       45 MINUTES SPENT BY ME on the date of service doing chart review, history, exam, documentation & further activities per the note.      Data     I have personally reviewed the following data over the past 24 hrs:    8.5  \   14.8   / 175     136 98 13.6 /  139 (H)   3.8 27 0.93 \       ALT: 30 AST: 26 AP: 113 TBILI: 0.6   ALB: 4.3 TOT PROTEIN: 7.5 LIPASE: 54       Trop: 9 BNP: N/A       Imaging results reviewed over the past 24 hrs:   Recent Results (from the past 24 hour(s))   XR Chest 2 Views    Narrative    EXAM: CHEST 2 VIEWS  LOCATION: [facility]  DATE/TIME: 02/19/2023, 5:19 AM    INDICATION: Chest pain.  COMPARISON: None.    FINDINGS: The lungs are clear. Normal size cardiac silhouette.      Impression    IMPRESSION: No evidence of active cardiopulmonary disease.        US Abdomen  Limited (RUQ)    Narrative    EXAM: US ABDOMEN LIMITED  LOCATION: [facility]  DATE/TIME: 2/19/2023 5:35 AM    INDICATION: upper abdominal post prandial pain  COMPARISON: CT abdomen 08/20/2010.  TECHNIQUE: Limited abdominal ultrasound.    FINDINGS:    GALLBLADDER: Immobile gallstone in the gallbladder neck. Gallbladder wall thickened to 7 mm wide. Patient was tender while scanning over the gallbladder.    BILE DUCTS: No biliary dilatation. The common duct measures 4 mm.    LIVER: Echogenic parenchyma. No focal mass.    RIGHT KIDNEY: No hydronephrosis.    PANCREAS: The visualized portions are normal.    No ascites.      Impression    IMPRESSION:  1.  Cholelithiasis and findings concerning for acute cholecystitis.  2.  Hepatic steatosis.

## 2023-02-19 NOTE — PROGRESS NOTES
"[facility]    Medicine Progress Note - Hospitalist Service    Date of Admission:  2/19/2023    Assessment & Plan   Jez Mojica, 53 year old male, with past medical history of anxiety, diverticulosis, basal cell carcinoma.  Presented with abdominal pain and acute cholecystitis.    Acute cholecystitis  Abdominal pain  Admitted for abdominal pain and ultrasound findings of cholelithiasis and acute cholecystitis with hepatic steatosis.  General surgery consulted with laparoscopic cholecystectomy 2/19/2023.    Patient discussed with surgery service following laparoscopic cholecystectomy, doing well.  General surgery kindly offered to assume care with plans for hospital discharge, possibly later today.  Help appreciated.  Follow-up with general surgery as arranged and primary clinic provider.  Defer to general surgery need for future antibiotics, started IV Zosyn on admission.  Admission laboratory results reviewed and satisfactory including admission chest x-ray and EKG.    History of anxiety    Resume sertraline as prior to admission, follow-up with primary clinic provider    History of diverticulosis  Past right colectomy, 2005, see prior notes.  Follow-up with primary clinic provider.     Diet:   Regular, or as per surgery  DVT Prophylaxis: Pneumatic Compression Devices  Glasgow Catheter: Not present  Lines: None     Cardiac Monitoring: ACTIVE order. Indication: Procedural area  Code Status: Full Code      Clinically Significant Risk Factors Present on Admission                       # Overweight: Estimated body mass index is 26.02 kg/m  as calculated from the following:    Height as of this encounter: 1.727 m (5' 8\").    Weight as of this encounter: 77.6 kg (171 lb 1.6 oz).           Disposition Plan      Expected Discharge Date: 02/19/2023      Destination: home with family            Dillon Reinaldo Leiva MD  Hospitalist Service  [facility]  Securely message with " Teresa (more info)  Text page via [facility] Paging/Directory   ______________________________________________________________________    Interval History   Awake alert, sitting up in bed, abdominal pain nearly resolved, awaiting general surgery consult for possible cholecystectomy today.  No complaints of shortness of breath or chest pain.  No nausea or vomiting.  Afebrile and hemodynamically stable.    Physical Exam   Vital Signs: Temp: 97.3  F (36.3  C) Temp src: Temporal BP: 131/79 Pulse: 82   Resp: 20 SpO2: 97 % O2 Device: Simple face mask Oxygen Delivery: 6 LPM  Weight: 171 lbs 1.6 oz    Awake alert, normal mood and affect  Lungs clear  Heart S1-S2 regular rhythm, no rubs or gallops  Abdomen soft, with mild tenderness in the epigastric region, no rebound or rigidity  Extremities without edema  Skin warm and dry  Mental status awake alert, pleasant, normal mood and affect    Medical Decision Making             Data     I have personally reviewed the following data over the past 24 hrs:    8.5  \   14.8   / 175     136 98 13.6 /  139 (H)   3.8 27 0.93 \       ALT: 30 AST: 26 AP: 113 TBILI: 0.6   ALB: 4.3 TOT PROTEIN: 7.5 LIPASE: 54       Trop: 8 BNP: N/A       Imaging results reviewed over the past 24 hrs:   Recent Results (from the past 24 hour(s))   XR Chest 2 Views    Narrative    EXAM: CHEST 2 VIEWS  LOCATION: [facility]  DATE/TIME: 02/19/2023, 5:19 AM    INDICATION: Chest pain.  COMPARISON: None.    FINDINGS: The lungs are clear. Normal size cardiac silhouette.      Impression    IMPRESSION: No evidence of active cardiopulmonary disease.        US Abdomen Limited (RUQ)    Narrative    EXAM: US ABDOMEN LIMITED  LOCATION: [facility]  DATE/TIME: 2/19/2023 5:35 AM    INDICATION: upper abdominal post prandial pain  COMPARISON: CT abdomen 08/20/2010.  TECHNIQUE: Limited abdominal ultrasound.    FINDINGS:    GALLBLADDER: Immobile gallstone in the gallbladder neck.  Gallbladder wall thickened to 7 mm wide. Patient was tender while scanning over the gallbladder.    BILE DUCTS: No biliary dilatation. The common duct measures 4 mm.    LIVER: Echogenic parenchyma. No focal mass.    RIGHT KIDNEY: No hydronephrosis.    PANCREAS: The visualized portions are normal.    No ascites.      Impression    IMPRESSION:  1.  Cholelithiasis and findings concerning for acute cholecystitis.  2.  Hepatic steatosis.

## 2023-02-19 NOTE — ED TRIAGE NOTES
Pt BIBA for ongoing chest pain that has worsened since 800. Chest pain is centralized. Pt states pain also occurred Monday but went away on its own

## 2023-02-19 NOTE — ANESTHESIA POSTPROCEDURE EVALUATION
Patient: Jez Mojica    Procedure: Procedure(s):  CHOLECYSTECTOMY, LAPAROSCOPIC       Anesthesia Type:  General    Note:  Disposition: Outpatient   Postop Pain Control: Uneventful            Sign Out: Well controlled pain   PONV: No   Neuro/Psych: Uneventful            Sign Out: Acceptable/Baseline neuro status   Airway/Respiratory: Uneventful            Sign Out: Acceptable/Baseline resp. status   CV/Hemodynamics: Uneventful            Sign Out: Acceptable CV status   Other NRE: NONE   DID A NON-ROUTINE EVENT OCCUR? No           Last vitals:  Vitals Value Taken Time   /72 02/19/23 1321   Temp 36.3  C (97.3  F) 02/19/23 1243   Pulse 79 02/19/23 1325   Resp 25 02/19/23 1325   SpO2 95 % 02/19/23 1325   Vitals shown include unvalidated device data.    Electronically Signed By: Tree Newton MD  February 19, 2023  2:54 PM

## 2023-02-19 NOTE — ANESTHESIA CARE TRANSFER NOTE
Patient: Jez Mojica    Procedure: Procedure(s):  CHOLECYSTECTOMY, LAPAROSCOPIC       Diagnosis: Calculus of gallbladder with acute cholecystitis without obstruction [K80.00]  Diagnosis Additional Information: No value filed.    Anesthesia Type:   General     Note:    Oropharynx: oropharynx clear of all foreign objects  Level of Consciousness: awake  Oxygen Supplementation: face mask  Level of Supplemental Oxygen (L/min / FiO2): 6  Independent Airway: airway patency satisfactory and stable  Dentition: dentition unchanged  Vital Signs Stable: post-procedure vital signs reviewed and stable  Report to RN Given: handoff report given  Patient transferred to: PACU    Handoff Report: Identifed the Patient, Identified the Reponsible Provider, Reviewed the pertinent medical history, Discussed the surgical course, Reviewed Intra-OP anesthesia mangement and issues during anesthesia, Set expectations for post-procedure period and Allowed opportunity for questions and acknowledgement of understanding      Vitals:  Vitals Value Taken Time   /82 02/19/23 1234   Temp     Pulse 81 02/19/23 1238   Resp 28 02/19/23 1238   SpO2 97 % 02/19/23 1238   Vitals shown include unvalidated device data.    Electronically Signed By: KEV Wilder CRNA  February 19, 2023  12:39 PM

## 2023-02-19 NOTE — OR NURSING
Pt tolerating michael crackers and apple juice. Encouraged to take deep breaths and cough with a pillow or folded blanket to splint abd to help with pain if needs to cough. Currently trying to call his wife to let her know he will be discharged from PACU. ABCDs intact.

## 2023-02-19 NOTE — DISCHARGE INSTRUCTIONS
If you continue to have symptoms of acid reflux (heartburn, indigestion) we recommend you try Prilosec OTC / Omeprazole and follow-up with your primary care provider. You can obtain this medication over the counter.     ------------------------------------------------------------------------------------------      [facility]  Discharge Instructions: Post-Operative Cholecystectomy    ACTIVITY  Expect to feel tired after your surgery.  This will gradually resolve.    Take frequent, short walks and increase your activity gradually.    Avoid strenuous physical activity or heavy lifting greater than 15-20 lbs. for 2-3 weeks.  You may climb stairs.  You may drive without restrictions when you are not using any prescription pain medication and feel comfortable in a car.  You may return to work/school when you are comfortable without any prescription pain medication.    WOUND CARE  You may remove your outer dressing or Band-Aids and shower 48 hours after the surgery.  Pat your incisions dry and leave them open to air.  Re-apply dressing (Band-Aids or gauze/tape) as needed for comfort or drainage.  You may have steri-strips (looks like white tape) on your incision.  You may peel off the steri-strips 2 weeks after your surgery if they have not peeled off on their own.  Do not soak your incisions in a tub or pool for 2 weeks.   Do not apply any lotions, creams, or ointments to your incisions.  A ridge under your incisions is normal and will gradually resolve.    DIET  Start with liquids, then gradually resume your regular diet as tolerated.  Avoid heavy, spicy, and greasy meals for 2-3 days.  Drink plenty of fluids to stay hydrated.  It is not uncommon to experience some loose stools or diarrhea after surgery.  This is your body's way of adapting to the bile which will slowly drain into your intestine.  A low fat diet may help with this.  This should improve over 1-2 months.    PAIN  Expect some  tenderness and discomfort at the incision sites.  Use the prescribed pain medication at your discretion.  Expect gradual resolution of your pain over several days.  You may take ibuprofen with food (unless you have been told not to) or acetaminophen/Tylenol instead of or in addition to your prescribed pain medication.  However, if you are taking Norco, do not take any additional acetaminophen/Tylenol.  Do not drink alcohol or drive while you are taking pain medications.  You may apply ice to your incisions in 20 minute intervals as needed for the next 48 hours.  After that time, consider switching to heat if you prefer.    EXPECTATIONS  Pain medications can cause constipation.  Limit use when possible.  Take an over the counter or prescribed stool softener/stimulant, such as Senna-Docusate, 1-2 times a day with plenty of water.  You may take a mild over the counter laxative, such as Miralax or a Dulcolax suppository, as needed.  You may discontinue these medications once you are having regular bowel movements and/or are no longer taking your narcotic pain medication.    You may have shoulder or upper back discomfort due to the gas used in surgery.  This is temporary and should resolve in 48-72 hours.  Short, frequent walks may help with this.  If you are unable to urinate for 8 hours or feel as though you are not emptying your bladder adequately, we recommend you seek care at an ER or Urgent Care facility for possible catheter placement.     FOLLOW UP  Our office will contact you in approximately 2-3 weeks to check on your progress and answer any questions you may have.  If you are doing well, you will not need to return for a follow up appointment.  If any concerns are identified over the phone, we will help you make an appointment to see a provider.   If you have not received a phone call, have any questions or concerns, or would like to be seen, please call us at [phone] and ask to speak with our nurse.  We  are located at [address] [place] [address].    CALL OUR OFFICE -[phone] IF YOU HAVE:   Chills or fever above 101 F.  Increased redness, warmth, or drainage at your incisions.  Significant bleeding.  Pain not relieved by your pain medication or rest.  Increasing pain after the first 48 hours.  Any other concerns or questions.     Same Day Surgery Discharge Instructions for  Sedation and General Anesthesia     It's not unusual to feel dizzy, light-headed or faint for up to 24 hours after surgery or while taking pain medication.  If you have these symptoms: sit for a few minutes before standing and have someone assist you when you get up to walk or use the bathroom.    You should rest and relax for the next 24 hours. We recommend you make arrangements to have an adult stay with you for at least 24 hours after your discharge.  Avoid hazardous and strenuous activity.    DO NOT DRIVE any vehicle or operate mechanical equipment for 24 hours following the end of your surgery.  Even though you may feel normal, your reactions may be affected by the medication you have received.    Do not drink alcoholic beverages for 24 hours following surgery.     Slowly progress to your regular diet as you feel able. It's not unusual to feel nauseated and/or vomit after receiving anesthesia.  If you develop these symptoms, drink clear liquids (apple juice, ginger ale, broth, 7-up, etc. ) until you feel better.  If your nausea and vomiting persists for 24 hours, please notify your surgeon.      All narcotic pain medications, along with inactivity and anesthesia, can cause constipation. Drinking plenty of liquids and increasing fiber intake will help.    For any questions of a medical nature, call your surgeon.    Do not make important decisions for 24 hours.    If you had general anesthesia, you may have a sore throat for a couple of days related to the breathing tube used during surgery.  You may use Cepacol  lozenges to help with this discomfort.  If it worsens or if you develop a fever, contact your surgeon.     If you feel your pain is not well managed with the pain medications prescribed by your surgeon, please contact your surgeon's office to let them know so they can address your concerns.                          **If you have concerns or questions about your procedure,    please contact Dr Means at  [phone]**

## 2023-02-19 NOTE — ANESTHESIA PROCEDURE NOTES
Airway       Patient location during procedure: OR       Procedure Start/Stop Times: 2/19/2023 11:21 AM  Staff -        Anesthesiologist:  Tree Newton MD       CRNA: Liane Thomas APRN CRNA       Performed By: CRNA  Consent for Airway        Urgency: elective  Indications and Patient Condition       Indications for airway management: artemio-procedural       Induction type:intravenous       Mask difficulty assessment: 1 - vent by mask    Final Airway Details       Final airway type: endotracheal airway       Successful airway: ETT - single  Endotracheal Airway Details        ETT size (mm): 8.0       Cuffed: yes       Successful intubation technique: video laryngoscopy       VL Blade Size: Busby 4       Grade View of Cords: 1       Adjucts: stylet       Position: Right       Measured from: gums/teeth       Secured at (cm): 23       Bite block used: None    Post intubation assessment        Placement verified by: capnometry, equal breath sounds and chest rise        Number of attempts at approach: 1       Number of other approaches attempted: 0       Secured with: silk tape       Ease of procedure: easy       Dentition: Unchanged    Medication(s) Administered   Medication Administration Time: 2/19/2023 11:21 AM

## 2023-02-19 NOTE — ED NOTES
Bed: ED05  Expected date:   Expected time:   Means of arrival:   Comments:  Alexus 1 53M chest pain for 7 hrs

## 2023-02-19 NOTE — OR NURSING
Pt tolerating ice chips, ice water, and applesauce. Pt on RA. abd pain of 1/10 s/p surgery. ABCDs intact. Pt to go to phase 2 for eventual discharge.

## 2023-02-19 NOTE — ED PROVIDER NOTES
History     Chief Complaint:  Chest Pain       The history is provided by the patient.      Jez Mojica is a 53 year old male with a history of diverticulosis and right colectomy who presents with a centralized chest pain. The patient provides that he started having this pain earlier tonight at 1800 and it has been worsening since. This pain is worse with deep breaths and eating but does not radiate into his back or shoulders. He previously had this pain 6 days ago and 4 days ago where both times the pain resolved on its own. He had called for EMS the first time and was told his pain was likely reflux and it improved with milk.  Tried that at onset but it didn't work this time.  He denies any nausea or vomiting with this. No prior cardiac history.  Does not smoke.  Denies FHx CAD/MI.    Independent Historian: See above HPI for details.    Review of External Notes: None     ROS:  Review of Systems   Cardiovascular: Positive for chest pain.   Musculoskeletal: Negative for arthralgias (shoulders) and back pain.   All other systems reviewed and are negative.      Allergies:  The patient has no known allergies to medications.    Medications:    Zoloft    Past Medical History:    Diverticulosis  Adenomatous polyp of colon  Anxiety  Basal cell carcinoma  Onychomycosis  Malignant neoplasm of skin    Past Surgical History:    Right colectomy  Appendectomy   Skin cancer excision    Family History:    Skin cancer  Lymphoma  Anxiety    Social History:  Patient came from home by EMS.  Patient is unaccompanied in the ED.  PCP: Lili Tabor     Physical Exam     Patient Vitals for the past 24 hrs:   BP Temp Temp src Pulse Resp SpO2   02/19/23 0538 -- -- -- 75 18 97 %   02/19/23 0430 (!) 153/99 97.5  F (36.4  C) Oral 71 18 98 %        Physical Exam  Eyes:               Sclera white; Pupils are equal and round  ENT:                External ears and nares normal  CV:                  Rate as above with regular rhythm    Resp:               Breath sounds clear and equal bilaterally                          Non-labored, no retractions or accessory muscle use  GI:                   Abdomen is soft, epigastric tenderness                          No rebound tenderness or peritoneal features  MS:                  Moves all extremities  Skin:                Warm and dry  Neuro:             Speech is normal and fluent. No apparent deficit.    Emergency Department Course     ECG  ECG obtained at 0428, ECG read at 0442  Normal sinus rhythm  Normal ECG  Rate 66 bpm. IL interval 128 ms. QRS duration 90 ms. QT/QTc 402/421 ms. P-R-T axes 4 79 69.    Imaging:  US Abdomen Limited (RUQ)   Final Result   IMPRESSION:   1.  Cholelithiasis and findings concerning for acute cholecystitis.   2.  Hepatic steatosis.            XR Chest 2 Views   Final Result   IMPRESSION: No evidence of active cardiopulmonary disease.                Report per radiology    Laboratory:  Labs Ordered and Resulted from Time of ED Arrival to Time of ED Departure   COMPREHENSIVE METABOLIC PANEL - Abnormal       Result Value    Sodium 136      Potassium 3.8      Chloride 98      Carbon Dioxide (CO2) 27      Anion Gap 11      Urea Nitrogen 13.6      Creatinine 0.93      Calcium 10.1 (*)     Glucose 139 (*)     Alkaline Phosphatase 113      AST 26      ALT 30      Protein Total 7.5      Albumin 4.3      Bilirubin Total 0.6      GFR Estimate >90     TROPONIN T, HIGH SENSITIVITY - Normal    Troponin T, High Sensitivity 9     LIPASE - Normal    Lipase 54     CBC WITH PLATELETS AND DIFFERENTIAL    WBC Count 8.5      RBC Count 5.16      Hemoglobin 14.8      Hematocrit 43.5      MCV 84      MCH 28.7      MCHC 34.0      RDW 12.6      Platelet Count 175      % Neutrophils 79      % Lymphocytes 12      % Monocytes 7      % Eosinophils 1      % Basophils 0      % Immature Granulocytes 1      NRBCs per 100 WBC 0      Absolute Neutrophils 6.7      Absolute Lymphocytes 1.1      Absolute  Monocytes 0.6      Absolute Eosinophils 0.1      Absolute Basophils 0.0      Absolute Immature Granulocytes 0.1      Absolute NRBCs 0.0          Emergency Department Course & Assessments:       Interventions:  Medications   lidocaine (viscous) (XYLOCAINE) 2 % 15 mL, alum & mag hydroxide-simethicone (MAALOX) 15 mL GI Cocktail (30 mLs Oral Given 2/19/23 6021)        Independent Interpretation (X-rays, CTs, rhythm strip):  EKG as above    Consultations/Discussion of Management or Tests:  I spoke with Dr. Gavin of General Surgery.  6:38 am - I consulted with Dr. Hanley of the hospitalist service and discussed patient admission. They accepted care of the patient.     Social Determinants of Health affecting care:   None    Assessments:  3438 I obtained history and examined the patient as noted above.  0555 I rechecked the patient and explained findings.    Disposition:  The patient was admitted to the hospital under the care of Dr. Hanley.     Impression & Plan      Medical Decision Making:  Jez Mojica is here for evaluation of new chest pain with epigastric tenderness on exam.  EKG was obtained immediately and demonstrates no ischemic changes, pre-excitation, pericarditis or dysrhythmia.  Differential includes cardiac, thoracic, and upper abdominal pathology.  Initial troponin in the gender normal range.  No associated anemia to suggest bleeding ulcer, hepatitis, biliary obstruction.  Chest x-ray was without acute abnormalities.  Ultrasound revealed the likely source of pain is cholecystitis with a stone lodged in the gallbladder neck.  This is a location at high risk for disease progression regarding the cholecystitis.  Antibiotics given and admission arranged.  NPO.  Based on this diagnosis concern for ACS is lower.  However with presenting complaint of chest pain, second troponin is ordered.  If this is stable compared to the first then I have no further concerns for concurrent cardiac related  pathology.    Diagnosis:    ICD-10-CM    1. Calculus of gallbladder with acute cholecystitis without obstruction  K80.00       2. Chest pain, unspecified type  R07.9               Scribe Disclosure:  I, Gerber Hollis, am serving as a scribe at 4:58 AM on 2/19/2023 to document services personally performed by Hilda Brito MD based on my observations and the provider's statements to me.       Hilda Brito MD  02/19/23 0644

## 2023-02-21 LAB
PATH REPORT.COMMENTS IMP SPEC: NORMAL
PATH REPORT.COMMENTS IMP SPEC: NORMAL
PATH REPORT.FINAL DX SPEC: NORMAL
PATH REPORT.GROSS SPEC: NORMAL
PATH REPORT.MICROSCOPIC SPEC OTHER STN: NORMAL
PATH REPORT.RELEVANT HX SPEC: NORMAL
PHOTO IMAGE: NORMAL

## 2023-03-15 ENCOUNTER — TELEPHONE (OUTPATIENT)
Dept: SURGERY | Facility: CLINIC | Age: 53
End: 2023-03-15
Payer: COMMERCIAL

## 2023-03-15 NOTE — TELEPHONE ENCOUNTER
[facility]  Post op call note  March 15, 2023       Jez Mojica was called for an update regarding his recovery.  There was no answer and a message was left encouraging him to call us back with any questions, concerns, or to provide an update.        Arely Mcgowan PA-C  [facility]  [phone]

## (undated) DEVICE — ENDO TROCAR FIRST ENTRY KII FIOS Z-THRD 11X100MM CTF33

## (undated) DEVICE — SU VICRYL 4-0 PS-2 18" UND J496H

## (undated) DEVICE — DEVICE SUTURE GRASPER TROCAR CLOSURE 14GA PMITCSG

## (undated) DEVICE — ENDO SCOPE WARMER LF TM500

## (undated) DEVICE — GLOVE BIOGEL PI MICRO INDICATOR UNDERGLOVE SZ 7.5 48975

## (undated) DEVICE — SUCTION CANISTER MEDIVAC LINER 3000ML W/LID 65651-530

## (undated) DEVICE — GLOVE BIOGEL PI MICRO SZ 7.5 48575

## (undated) DEVICE — ESU HOLDER LAP INST DISP PURPLE LONG 330MM H-PRO-330

## (undated) DEVICE — PREP CHLORAPREP 26ML TINTED HI-LITE ORANGE 930815

## (undated) DEVICE — SOL NACL 0.9% INJ 1000ML BAG 2B1324X

## (undated) DEVICE — ENDO POUCH UNIV RETRIEVAL SYSTEM INZII 10MM CD001

## (undated) DEVICE — ESU GROUND PAD UNIVERSAL W/O CORD

## (undated) DEVICE — EVAC SYSTEM CLEAR FLOW SC082500

## (undated) DEVICE — SUCTION IRR STRYKERFLOW II W/TIP 250-070-520

## (undated) DEVICE — SU VICRYL 0 UR-6 27" J603H

## (undated) DEVICE — LINEN TOWEL PACK X5 5464

## (undated) DEVICE — SOL WATER IRRIG 1000ML BOTTLE 2F7114

## (undated) DEVICE — ENDO TROCAR SLEEVE KII Z-THREADED 05X100MM CTS02

## (undated) DEVICE — CLIP APPLIER ENDO 5MM M/L LIGAMAX EL5ML

## (undated) DEVICE — PACK LAP CHOLE SLC15LCFSD

## (undated) DEVICE — ENDO TROCAR FIRST ENTRY KII FIOS Z-THRD 05X100MM CTF03

## (undated) RX ORDER — OXYCODONE HYDROCHLORIDE 5 MG/1
TABLET ORAL
Status: DISPENSED
Start: 2023-02-19

## (undated) RX ORDER — HYDROMORPHONE HYDROCHLORIDE 1 MG/ML
INJECTION, SOLUTION INTRAMUSCULAR; INTRAVENOUS; SUBCUTANEOUS
Status: DISPENSED
Start: 2023-02-19

## (undated) RX ORDER — FENTANYL CITRATE 50 UG/ML
INJECTION, SOLUTION INTRAMUSCULAR; INTRAVENOUS
Status: DISPENSED
Start: 2023-02-19

## (undated) RX ORDER — BUPIVACAINE HYDROCHLORIDE AND EPINEPHRINE 5; 5 MG/ML; UG/ML
INJECTION, SOLUTION EPIDURAL; INTRACAUDAL; PERINEURAL
Status: DISPENSED
Start: 2023-02-19

## (undated) RX ORDER — NEOSTIGMINE METHYLSULFATE 1 MG/ML
VIAL (ML) INJECTION
Status: DISPENSED
Start: 2023-02-19

## (undated) RX ORDER — PROPOFOL 10 MG/ML
INJECTION, EMULSION INTRAVENOUS
Status: DISPENSED
Start: 2023-02-19

## (undated) RX ORDER — ONDANSETRON 2 MG/ML
INJECTION INTRAMUSCULAR; INTRAVENOUS
Status: DISPENSED
Start: 2023-02-19

## (undated) RX ORDER — PIPERACILLIN SODIUM, TAZOBACTAM SODIUM 3; .375 G/15ML; G/15ML
INJECTION, POWDER, LYOPHILIZED, FOR SOLUTION INTRAVENOUS
Status: DISPENSED
Start: 2023-02-19

## (undated) RX ORDER — DEXAMETHASONE SODIUM PHOSPHATE 4 MG/ML
INJECTION, SOLUTION INTRA-ARTICULAR; INTRALESIONAL; INTRAMUSCULAR; INTRAVENOUS; SOFT TISSUE
Status: DISPENSED
Start: 2023-02-19

## (undated) RX ORDER — GLYCOPYRROLATE 0.2 MG/ML
INJECTION, SOLUTION INTRAMUSCULAR; INTRAVENOUS
Status: DISPENSED
Start: 2023-02-19